# Patient Record
(demographics unavailable — no encounter records)

---

## 2025-04-14 NOTE — PROCEDURE
[de-identified] : BILATERAL KNEE CORTISONE INJECTION Discussed at length with the patient the planned steroid and lidocaine injection. The risks, benefits, convalescence and alternatives were reviewed. The possible side effects discussed included but were not limited to: pain, swelling, heat and redness. These symptoms are generally mild but if they are extensive then contact the office. Giving pain relievers by mouth such as NSAIDs or Tylenol can generally treat the reactions to steroid and lidocaine. Rare cases of infection have been noted. Rash, hives and itching may occur post injection. If you have muscle pain or cramps, flushing and or swelling of the face, rapid heart beat, nausea, dizziness, fever, chills, headache, difficulty breathing, swelling in the arms or legs, or have a prickly feeling of your skin, contact a health care provider immediately.   Following this discussion, the knee was prepped with betadine and under sterile conditions 5 cc of 2% lidocaine and 1 cc depo-medrol (40mg) were injected with a 21 gauge needle. The needle was introduced into the joint, aspiration was performed to ensure intra-articular placement and the medication was injected. Upon withdrawal of the needle the site was cleaned with alcohol and a bandaid applied. The patient tolerated the injection well and there were no adverse effects. Post injection instructions included no strenuous activity for 24 hours, cryotherapy and if there are any adverse effects to contact the office.

## 2025-04-14 NOTE — PHYSICAL EXAM
[de-identified] : General appearance: well-nourished and hydrated, pleasant, alert and oriented x 3, cooperative. HEENT: Normocephalic, EOM intact, Nasal septum midline, Oral cavity clear, External auditory canal clear. Cardiovascular: no apparent abnormalities, no lower leg edema, no varicosities, pedal pulses are palpable. Lymphatics Lymph nodes: none palpated, Lymphedema: not present. Neurologic: sensation is normal, no muscle weakness in upper or lower extremities, patella tendon reflexes intact. Dermatologic no apparent skin lesions, moist, warm, no rash. Spine: cervical spine appears normal and moves freely, thoracic spine moves freely, lumbosacral spine appears normal and moves freely. thoracolumbar scoliosis. Paracervical tenderness. Gait: nonantalgic.   Left knee Inspection: trace effusion. Wounds: none. Alignment: normal. Palpation: medial joint line tenderness on palpation. ROM active (in degrees): 0-130 with mild crepitus Ligamentous laxity: all ligaments appear stable,, negative ant. drawer test, negative post. drawer test, stable to varus stress test, stable to valgus stress test. negative Lachman's test, negative pivot shift test Meniscal Test: negative McMurrays, negative [place]. Patellofemoral Alignment Test: Q angle-, normal. Muscle Test: good quad strength. Leg examination: calf is soft and non-tender.   Right knee Inspection: trace effusion Wounds: none. Alignment: normal. Palpation: medial joint line tenderness on palpation. ROM active (in degrees): 0-130 with mild crepitus Ligamentous laxity: all ligaments appear stable, negative ant. drawer test, negative post. drawer test, stable to varus stress test, stable to valgus stress test. negative Lachman's test, negative pivot shift test Meniscal Test: negative [place]rays, negative [place]. Patellofemoral Alignment Test: Q angle-, normal. Muscle Test: good quad strength. Leg examination: calf soft and non tender.   Left hip Inspection: No swelling or ecchymosis. Wounds: none. Palpation: non-tender. Stability: no instability. Strength: 5/5 all motor groups. ROM: no pain with FROM. Leg length: equal.   Right hip Inspection: No swelling or ecchymosis. Wounds: none. Palpation: non-tender. Stability: no instability. Strength: 5/5 all motor groups. ROM: no pain with FROM. Leg length: equal.   Left ankle Inspection: no erythema noted, no swelling noted. Palpation: no pain on palpation. ROM: FROM without crepitus. Muscle strength: 5/5. Stability: no instability noted.   Right ankle Inspection: no erythema noted, no swelling noted. ROM: FROM without crepitus. Palpation: no pain on palpation. Muscle strength: 5/5. Stability: no instability noted.   Left foot Inspection: color, texture and turgor are normal. ROM: full range of motion of all joints without pain or crepitus. Palpation: no tenderness. Stability: no instability noted.   Right foot Inspection: color, texture and turgor are normal. ROM: full range of motion of all joints without pain or crepitus. Palpation: tenderness on lateral aspect of her foot in area of cuboid. Stability: no instability noted.   Left shoulder Inspection: no muscle asymmetry, no atrophy. Palpation: no tenderness noted, ACJ non-tender. ROM: full active ROM, full passive ROM. Strength testing): anterior deltoid, supraspinatus, infraspinatus, subscapularis all 5/5. Stability test: ant. apprehension negative, post. apprehension negative, relocation test negative. Impingement Test: negative NEER.   Right shoulder Inspection: no muscle asymmetry, no atrophy. Palpation: no tenderness noted, ACJ non-tender. ROM: full active ROM, full passive ROM. Strength testing): anterior deltoid, supraspinatus, infraspinatus, subscapularis all 5/5. Stability test: ant. apprehension negative, post. apprehension negative, relocation test negative. Impingement Test: negative NEER. Surgical Wounds: none.   Left elbow Inspection: negative swelling. Wounds: none. Palpation: non-tender. ROM: full ROM. Strength: 5/5 all groups. Stability: no instability. Mass: none.   Right elbow Inspection: negative swelling. Wounds: none. Palpation: non-tender. ROM: full ROM. Strength: 5/5 all groups. Stability: no instability. Mass: none.   Left wrist Inspection: negative swelling. Wound: none. Palpation (bone): no tenderness. ROM: full ROM. Strength: full , good.   Right wrist Inspection: negative swelling. Wound: none. Palpation (bone): no tenderness. ROM: full ROM. Strength: full , good.   Left hand Inspection: no skin changes, normal appearance. Wounds: none. Strength: full , able to make full fist. Sensation: light touch intact all fingers and thumb. Vascular: good capillary refill < 3 seconds, all fingers and thumb. Mass: none.   Right hand Inspection: no skin changes, normal appearance. Wounds: none. Strength: full , able to make full fist. Sensation: light touch intact all fingers and thumb. Vascular: good capillary refill < 3 seconds, all fingers and thumb. Mass: none. [de-identified] : Left knee x-rays, standing AP/Lateral and Merchant films, and 45-degree PA standing view, taken at the office today shows degenerative arthritis, normal alignment, medial joint space narrowing, patella at appropriate height and center position, patellofemoral joint space narrowing, Kellgren and Lee grade 2-3   Right knee x-rays, standing AP/Lateral and Merchant films, and 45-degree PA standing view, taken at the office today shows degenerative arthritis, normal alignment, medial joint space narrowing, patella at appropriate height and center position, patellofemoral joint space narrowing, Kellgren and Lee grade 2-3

## 2025-04-14 NOTE — HISTORY OF PRESENT ILLNESS
[de-identified] : JASON LEIJA  75 year old female presents for evaluation of B/L knee pain which is chronic and ongoing for 2+ yrs with no acute injury. The injury is diffuse, sharp and dull depending on the day with swelling and no mechanical Sx. She walks up to 7 miles a day as she loves to walk, and stairs are difficult. She does not take medication except Advil prn. The pt had right knee Cortisone injection done 7 months ago by Dr. Alcantar at [facility] with a brief flushing reaction which has resolved. Shes uses Voltaren gel prn. PMHx GERD, IBS, and thyroid issue HRT. NKDA. She is seeking B/L knee injection today.

## 2025-04-14 NOTE — DISCUSSION/SUMMARY
[de-identified] : Discussed at length the nature of the patients condition. Their B/L knee symptoms appear secondary to degenerative arthritis. I do not think that surgery is warranted. Therefore, we will begin nonoperatively. For the acute pain patient was given a B/L knee cortisone injection today as detailed above for symptomatic relief. I also suggested home exercise, weight management, and Advil prn. They can continue activities as tolerated.  I will see her back in 3 months. We may consider Viscosupplementation injection in the future. I have referred her to Dr. Do for her right foot and ankle and Dr. Maldonado for her neck pain and scoliosis.

## 2025-04-14 NOTE — ADDENDUM
[FreeTextEntry1] : This note was written by Pino Emmanuel on 04/14/2025 acting as scribe for Dr. Can Najera M.D.   I, Dr. Can Najera, have read and attest that all the information, medical decision making and discharge instructions within are true and accurate.